# Patient Record
(demographics unavailable — no encounter records)

---

## 2025-02-10 NOTE — HEALTH RISK ASSESSMENT
[Good] : ~his/her~  mood as  good [Yes] : Yes [2 - 4 times a month (2 pts)] : 2-4 times a month (2 points) [No] : In the past 12 months have you used drugs other than those required for medical reasons? No [No falls in past year] : Patient reported no falls in the past year [Never] : Never [Patient reported colonoscopy was normal] : Patient reported colonoscopy was normal [None] : None [With Significant Other] : lives with significant other [Employed] : employed [Fully functional (bathing, dressing, toileting, transferring, walking, feeding)] : Fully functional (bathing, dressing, toileting, transferring, walking, feeding) [Fully functional (using the telephone, shopping, preparing meals, housekeeping, doing laundry, using] : Fully functional and needs no help or supervision to perform IADLs (using the telephone, shopping, preparing meals, housekeeping, doing laundry, using transportation, managing medications and managing finances) [Smoke Detector] : smoke detector [Carbon Monoxide Detector] : carbon monoxide detector [Seat Belt] :  uses seat belt [Sunscreen] : uses sunscreen [de-identified] : active [de-identified] : regular [Reports changes in hearing] : Reports no changes in hearing [Reports changes in vision] : Reports no changes in vision [Reports changes in dental health] : Reports no changes in dental health [MammogramComments] : scheduled later this month [ColonoscopyDate] : 2024 [de-identified] : wears glasses

## 2025-02-10 NOTE — ASSESSMENT
[FreeTextEntry1] : HCM  Labs drawn in office today  Gyn, mammogram, bone density advised  Derm utd, colonoscopy 2024 UTD  f/u prn or 1 year.

## 2025-02-10 NOTE — HISTORY OF PRESENT ILLNESS
[FreeTextEntry1] : Annual Physical [de-identified] : JAVIER SOLITARIO is a 60 yo woman with history of migraines, diverticulitis here for a physical.  She has been well overall.   Gyn due with Dr Phillips.  Mammogram, bone density due.  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  Colonoscopy utd 2024.  Did have Pfizer covid 19 vaccines.  The patient was diagnosed with migraine headaches in the past.  Improved with OTC excedrin  The patient is  with no children.  Her wife Evelio works as a .  The patient works as a .  Her mother who lives in [place] GI mass and breast cancer, currently under treatment.

## 2025-02-10 NOTE — REVIEW OF SYSTEMS
[Fever] : no fever [Vision Problems] : no vision problems [Nasal Discharge] : no nasal discharge [Chest Pain] : no chest pain [Shortness Of Breath] : no shortness of breath [Abdominal Pain] : no abdominal pain [Skin Rash] : no skin rash

## 2025-02-10 NOTE — PHYSICAL EXAM
[No Acute Distress] : no acute distress [Well Nourished] : well nourished [Well Developed] : well developed [Well-Appearing] : well-appearing [Normal Sclera/Conjunctiva] : normal sclera/conjunctiva [PERRL] : pupils equal round and reactive to light [EOMI] : extraocular movements intact [Normal Outer Ear/Nose] : the outer ears and nose were normal in appearance [Normal Oropharynx] : the oropharynx was normal [Normal TMs] : both tympanic membranes were normal [Normal Nasal Mucosa] : the nasal mucosa was normal [No JVD] : no jugular venous distention [No Lymphadenopathy] : no lymphadenopathy [Supple] : supple [Thyroid Normal, No Nodules] : the thyroid was normal and there were no nodules present [No Respiratory Distress] : no respiratory distress  [No Accessory Muscle Use] : no accessory muscle use [Clear to Auscultation] : lungs were clear to auscultation bilaterally [Normal Rate] : normal rate  [Regular Rhythm] : with a regular rhythm [Normal S1, S2] : normal S1 and S2 [No Murmur] : no murmur heard [No Carotid Bruits] : no carotid bruits [No Edema] : there was no peripheral edema [Normal Appearance] : normal in appearance [No Nipple Discharge] : no nipple discharge [No Axillary Lymphadenopathy] : no axillary lymphadenopathy [Soft] : abdomen soft [Non Tender] : non-tender [Non-distended] : non-distended [No Masses] : no abdominal mass palpated [Normal Bowel Sounds] : normal bowel sounds [No CVA Tenderness] : no CVA  tenderness [No Joint Swelling] : no joint swelling [No Rash] : no rash [Coordination Grossly Intact] : coordination grossly intact [No Focal Deficits] : no focal deficits [Normal Gait] : normal gait [Deep Tendon Reflexes (DTR)] : deep tendon reflexes were 2+ and symmetric [Speech Grossly Normal] : speech grossly normal [Memory Grossly Normal] : memory grossly normal [Normal Affect] : the affect was normal [Alert and Oriented x3] : oriented to person, place, and time [Normal Mood] : the mood was normal [Normal Insight/Judgement] : insight and judgment were intact

## 2025-03-06 NOTE — HISTORY OF PRESENT ILLNESS
[FreeTextEntry1] : rash [de-identified] : rash for 2 years  asymptomatic had biopsy that showed GA (report reviewed here)  has slightly elevated cholesterol no DM pt upto date on routine cancer screening  she has tried topical steroids and tacrolimus

## 2025-03-06 NOTE — ASSESSMENT
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 New diagnosis,  uncertain clinical course refractory to topical steroids and topical calcineurin inhibitors  Discussed potential association with dyslipidemia  Discussed possibility of medication induced - if easy to switch anithypertensive would recommend - pt will discuss with pcp  discussed tx options including phototherapy vs oral (hydroxychloroquine) pt opts for skin-directed therapy - reviewed treatment expectations including need to go 2-3x/week, slow titration upwards of dose to minimize side effects of burning/irritation, and maintenance/break once skin clears  we will see her back 4-6 weeks after starting phototherapy

## 2025-04-22 NOTE — HISTORY OF PRESENT ILLNESS
[FreeTextEntry1] : followup [de-identified] : started phototherapy 3/18/25 2x/week 354 mJ not noticing much change denies burning, never itchy

## 2025-04-22 NOTE — ASSESSMENT
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